# Patient Record
Sex: FEMALE | Race: OTHER | HISPANIC OR LATINO | Employment: UNEMPLOYED | URBAN - NONMETROPOLITAN AREA
[De-identification: names, ages, dates, MRNs, and addresses within clinical notes are randomized per-mention and may not be internally consistent; named-entity substitution may affect disease eponyms.]

---

## 2021-06-23 ENCOUNTER — OFFICE VISIT (OUTPATIENT)
Dept: FAMILY MEDICINE | Facility: CLINIC | Age: 41
End: 2021-06-23

## 2021-06-23 VITALS
BODY MASS INDEX: 28.79 KG/M2 | SYSTOLIC BLOOD PRESSURE: 120 MMHG | OXYGEN SATURATION: 99 % | DIASTOLIC BLOOD PRESSURE: 75 MMHG | WEIGHT: 128 LBS | TEMPERATURE: 99 F | RESPIRATION RATE: 18 BRPM | HEIGHT: 56 IN | HEART RATE: 77 BPM

## 2021-06-23 DIAGNOSIS — Z12.39 ENCOUNTER FOR SCREENING FOR MALIGNANT NEOPLASM OF BREAST, UNSPECIFIED SCREENING MODALITY: ICD-10-CM

## 2021-06-23 DIAGNOSIS — Z12.4 SCREENING FOR MALIGNANT NEOPLASM OF CERVIX: Primary | ICD-10-CM

## 2021-06-23 PROCEDURE — 99202 PR OFFICE/OUTPT VISIT, NEW, LEVL II, 15-29 MIN: ICD-10-PCS | Mod: ,,, | Performed by: FAMILY MEDICINE

## 2021-06-23 PROCEDURE — 88142 CYTOPATH C/V THIN LAYER: CPT | Mod: GCY | Performed by: FAMILY MEDICINE

## 2021-06-23 PROCEDURE — 99202 OFFICE O/P NEW SF 15 MIN: CPT | Mod: ,,, | Performed by: FAMILY MEDICINE

## 2021-06-24 LAB
GH SERPL-MCNC: NORMAL NG/ML
INSULIN SERPL-ACNC: NORMAL U[IU]/ML
LAB AP CLINICAL INFORMATION: NORMAL
LAB AP GYN INTERPRETATION: NEGATIVE
LAB AP PAP DISCLAIMER COMMENTS: NORMAL
RENIN PLAS-CCNC: NORMAL NG/ML/H

## 2023-11-14 ENCOUNTER — OFFICE VISIT (OUTPATIENT)
Dept: OBSTETRICS AND GYNECOLOGY | Facility: CLINIC | Age: 43
End: 2023-11-14

## 2023-11-14 VITALS
HEART RATE: 72 BPM | WEIGHT: 134 LBS | SYSTOLIC BLOOD PRESSURE: 112 MMHG | OXYGEN SATURATION: 100 % | TEMPERATURE: 99 F | RESPIRATION RATE: 18 BRPM | DIASTOLIC BLOOD PRESSURE: 70 MMHG | HEIGHT: 56 IN | BODY MASS INDEX: 30.14 KG/M2

## 2023-11-14 DIAGNOSIS — Z01.419 WOMEN'S ANNUAL ROUTINE GYNECOLOGICAL EXAMINATION: Primary | ICD-10-CM

## 2023-11-14 DIAGNOSIS — Z12.4 ENCOUNTER FOR PAPANICOLAOU SMEAR FOR CERVICAL CANCER SCREENING: ICD-10-CM

## 2023-11-14 DIAGNOSIS — Z11.51 SCREENING FOR HPV (HUMAN PAPILLOMAVIRUS): ICD-10-CM

## 2023-11-14 PROCEDURE — 88142 CYTOPATH C/V THIN LAYER: CPT | Mod: TC,GCY | Performed by: ADVANCED PRACTICE MIDWIFE

## 2023-11-14 PROCEDURE — 99396 PREV VISIT EST AGE 40-64: CPT | Mod: ,,, | Performed by: ADVANCED PRACTICE MIDWIFE

## 2023-11-14 PROCEDURE — 99396 PR PREVENTIVE VISIT,EST,40-64: ICD-10-PCS | Mod: ,,, | Performed by: ADVANCED PRACTICE MIDWIFE

## 2023-11-14 PROCEDURE — 87624 HUMAN PAPILLOMAVIRUS (HPV): ICD-10-PCS | Mod: ,,, | Performed by: CLINICAL MEDICAL LABORATORY

## 2023-11-14 PROCEDURE — 87624 HPV HI-RISK TYP POOLED RSLT: CPT | Mod: ,,, | Performed by: CLINICAL MEDICAL LABORATORY

## 2023-11-17 NOTE — PROGRESS NOTES
"  Patient ID: Linda Brooke is a 43 y.o. female     Chief Complaint:   Chief Complaint   Patient presents with    Annual Exam     Patient stated she is here for a PAP.        HPI: Linda Brooke is a 43 y.o. female who presents for well woman exam.  No issues, problems, or complaints.  [place] is native language but speaks and understands English fairly well. Offered use of clinic provided  service, declines. Denies health problems. Reports last Pap in  unsure of hx abnormal results.   LMP: Patient's last menstrual period was 10/12/2023.  Sexually active: yes, , declines STD testing   Contraceptive: BTL 15 years past  Mammogram: has never had mammogram, self pay    Past Medical History:   Diagnosis Date    No known health problems        Past Surgical History:   Procedure Laterality Date     SECTION      TUBAL LIGATION         Social History     Socioeconomic History    Marital status:      Spouse name: Memorio    Number of children: 2   Occupational History    Occupation: unemployed   Tobacco Use    Smoking status: Never     Passive exposure: Never    Smokeless tobacco: Never   Substance and Sexual Activity    Alcohol use: Never    Drug use: Never    Sexual activity: Yes     Partners: Male       Family History   Problem Relation Age of Onset    Diabetes Mother     Alcohol abuse Father     No Known Problems Sister     No Known Problems Daughter     Diabetes Maternal Aunt     Diabetes Maternal Grandmother        OB History          2    Para        Term                AB        Living             SAB        IAB        Ectopic        Multiple        Live Births   2                 /70 (BP Location: Right arm, Patient Position: Sitting, BP Method: Large (Automatic))   Pulse 72   Temp 98.9 °F (37.2 °C) (Oral)   Resp 18   Ht 4' 7.8" (1.417 m)   Wt 60.8 kg (134 lb)   LMP 10/12/2023 Comment: BTL  SpO2 100%   BMI 30.26 kg/m²     Wt Readings " from Last 3 Encounters:   11/14/23 60.8 kg (134 lb)   06/23/21 58.1 kg (128 lb)        ROS:  Review of Systems   Constitutional: Negative.    Eyes: Negative.    Respiratory: Negative.     Cardiovascular: Negative.    Gastrointestinal: Negative.    Endocrine: Negative.    Genitourinary: Negative.    Musculoskeletal: Negative.    Integumentary:  Negative.   Neurological: Negative.    Hematological: Negative.    Psychiatric/Behavioral: Negative.     Breast: negative.         PHYSICAL EXAM:  Physical Exam  Constitutional:       Appearance: Normal appearance.   HENT:      Head: Normocephalic.      Nose: Nose normal.   Eyes:      Extraocular Movements: Extraocular movements intact.   Cardiovascular:      Rate and Rhythm: Normal rate and regular rhythm.      Pulses: Normal pulses.      Heart sounds: Normal heart sounds.   Pulmonary:      Effort: Pulmonary effort is normal.      Breath sounds: Normal breath sounds.   Chest:   Breasts:     Right: Normal.      Left: Normal.   Abdominal:      Palpations: Abdomen is soft.      Tenderness: There is no abdominal tenderness.      Hernia: There is no hernia in the left inguinal area or right inguinal area.   Genitourinary:     General: Normal vulva.      Exam position: Lithotomy position.      Pubic Area: No rash.       Labia:         Right: No rash.         Left: No rash.       Urethra: No prolapse.      Vagina: Normal.      Cervix: Normal.      Uterus: Normal.       Adnexa:         Right: No mass or tenderness.          Left: No mass or tenderness.     Musculoskeletal:         General: Normal range of motion.      Cervical back: Normal range of motion and neck supple.   Skin:     General: Skin is warm and dry.   Neurological:      Mental Status: She is alert and oriented to person, place, and time.   Psychiatric:         Mood and Affect: Mood normal.         Behavior: Behavior normal.         Thought Content: Thought content normal.         Judgment: Judgment normal.           Assessment:  Women's annual routine gynecological examination  -     ThinPrep Pap Test; Future; Expected date: 11/14/2023  -     HPV DNA probe, amplified    Encounter for Papanicolaou smear for cervical cancer screening  -     ThinPrep Pap Test; Future; Expected date: 11/14/2023  -     HPV DNA probe, amplified    Screening for HPV (human papillomavirus)  -     ThinPrep Pap Test; Future; Expected date: 11/14/2023  -     HPV DNA probe, amplified    BMI 30.0-30.9,adult          ICD-10-CM ICD-9-CM    1. Women's annual routine gynecological examination  Z01.419 V72.31 ThinPrep Pap Test      ThinPrep Pap Test      HPV DNA probe, amplified      2. Encounter for Papanicolaou smear for cervical cancer screening  Z12.4 V76.2 ThinPrep Pap Test      ThinPrep Pap Test      HPV DNA probe, amplified      3. Screening for HPV (human papillomavirus)  Z11.51 V73.81 ThinPrep Pap Test      ThinPrep Pap Test      HPV DNA probe, amplified      4. BMI 30.0-30.9,adult  Z68.30 V85.30           Plan:  Annual exam completed, Pap specimen collected  Will call or send My Chart message after review of results  Patient was counseled today on ASCCP Pap guidelines and recommendations for yearly pelvic exams   Encouraged healthy dietary choices, increasing water intake, and exercising at least 3 x weekly  Encouraged to go into [facility] for breast exam and a mammogram will be ordered/covered (verified by call placed to [facility]) appointment scheduled for 11/21/23 @ 1030      Follow up in about 1 year (around 11/14/2024) for annual gyn exam.

## 2023-11-21 ENCOUNTER — TELEPHONE (OUTPATIENT)
Dept: OBSTETRICS AND GYNECOLOGY | Facility: CLINIC | Age: 43
End: 2023-11-21

## 2023-11-21 LAB
HPV 16: NEGATIVE
HPV 18: NEGATIVE
HPV OTHER: NEGATIVE

## 2023-11-21 NOTE — TELEPHONE ENCOUNTER
----- Message from Jael Carrillo CNM sent at 11/21/2023  1:49 PM CST -----  Advise Pap and HPV normal/negative. Recommend annual GYN exam and follow up screening Pap in 5 years.

## 2023-11-21 NOTE — PROGRESS NOTES
Advise Pap and HPV normal/negative. Recommend annual GYN exam and follow up screening Pap in 5 years.

## 2024-01-29 LAB — BCS RECOMMENDATION EXT: NORMAL

## 2025-06-03 ENCOUNTER — OFFICE VISIT (OUTPATIENT)
Dept: OBSTETRICS AND GYNECOLOGY | Facility: CLINIC | Age: 45
End: 2025-06-03

## 2025-06-03 VITALS
DIASTOLIC BLOOD PRESSURE: 70 MMHG | HEART RATE: 64 BPM | BODY MASS INDEX: 30.23 KG/M2 | TEMPERATURE: 98 F | HEIGHT: 56 IN | RESPIRATION RATE: 18 BRPM | SYSTOLIC BLOOD PRESSURE: 108 MMHG | WEIGHT: 134.38 LBS | OXYGEN SATURATION: 97 %

## 2025-06-03 DIAGNOSIS — Z11.51 SCREENING FOR HPV (HUMAN PAPILLOMAVIRUS): ICD-10-CM

## 2025-06-03 DIAGNOSIS — Z12.4 ENCOUNTER FOR PAPANICOLAOU SMEAR FOR CERVICAL CANCER SCREENING: ICD-10-CM

## 2025-06-03 DIAGNOSIS — Z01.419 WOMEN'S ANNUAL ROUTINE GYNECOLOGICAL EXAMINATION: Primary | ICD-10-CM

## 2025-06-03 PROCEDURE — 88142 CYTOPATH C/V THIN LAYER: CPT | Mod: TC,GCY | Performed by: ADVANCED PRACTICE MIDWIFE

## 2025-06-03 PROCEDURE — 87626 HPV SEP HI-RSK TYP&POOL RSLT: CPT | Mod: ,,, | Performed by: CLINICAL MEDICAL LABORATORY

## 2025-06-03 PROCEDURE — 99396 PREV VISIT EST AGE 40-64: CPT | Mod: ,,, | Performed by: ADVANCED PRACTICE MIDWIFE

## 2025-06-04 ENCOUNTER — PATIENT OUTREACH (OUTPATIENT)
Facility: HOSPITAL | Age: 45
End: 2025-06-04

## 2025-06-05 ENCOUNTER — RESULTS FOLLOW-UP (OUTPATIENT)
Facility: CLINIC | Age: 45
End: 2025-06-05

## 2025-06-06 LAB
HPV 16: NEGATIVE
HPV 18: NEGATIVE
HPV OTHER: NEGATIVE